# Patient Record
Sex: FEMALE | Race: WHITE | NOT HISPANIC OR LATINO | URBAN - METROPOLITAN AREA
[De-identification: names, ages, dates, MRNs, and addresses within clinical notes are randomized per-mention and may not be internally consistent; named-entity substitution may affect disease eponyms.]

---

## 2020-10-28 ENCOUNTER — OFFICE VISIT (OUTPATIENT)
Dept: ENDOCRINOLOGY | Facility: CLINIC | Age: 25
End: 2020-10-28

## 2020-10-28 ENCOUNTER — LAB (OUTPATIENT)
Dept: LAB | Facility: HOSPITAL | Age: 25
End: 2020-10-28

## 2020-10-28 VITALS
BODY MASS INDEX: 23.28 KG/M2 | HEIGHT: 69 IN | DIASTOLIC BLOOD PRESSURE: 74 MMHG | TEMPERATURE: 97.8 F | SYSTOLIC BLOOD PRESSURE: 112 MMHG | HEART RATE: 88 BPM | WEIGHT: 157.2 LBS | OXYGEN SATURATION: 99 %

## 2020-10-28 DIAGNOSIS — E03.9 ACQUIRED HYPOTHYROIDISM: Primary | ICD-10-CM

## 2020-10-28 DIAGNOSIS — E03.9 ACQUIRED HYPOTHYROIDISM: ICD-10-CM

## 2020-10-28 PROCEDURE — 99213 OFFICE O/P EST LOW 20 MIN: CPT | Performed by: INTERNAL MEDICINE

## 2020-10-28 PROCEDURE — 84443 ASSAY THYROID STIM HORMONE: CPT | Performed by: INTERNAL MEDICINE

## 2020-10-28 RX ORDER — LEVOTHYROXINE SODIUM 0.1 MG/1
100 TABLET ORAL DAILY
Qty: 90 TABLET | Refills: 3 | Status: SHIPPED | OUTPATIENT
Start: 2020-10-28 | End: 2021-10-04

## 2020-10-28 RX ORDER — LEVOTHYROXINE SODIUM 0.1 MG/1
100 TABLET ORAL DAILY
COMMUNITY
End: 2020-10-28 | Stop reason: SDUPTHER

## 2020-10-29 LAB — TSH SERPL DL<=0.05 MIU/L-ACNC: 6.71 UIU/ML (ref 0.27–4.2)

## 2020-11-04 ENCOUNTER — TELEPHONE (OUTPATIENT)
Dept: ENDOCRINOLOGY | Facility: CLINIC | Age: 25
End: 2020-11-04

## 2020-11-04 NOTE — TELEPHONE ENCOUNTER
PATIENT WOULD LIKE A CALL BACK ABOUT HER RECENT LAB RESULTS AND IF MEDICATIONS WILL BE CHANGING. PHONE NUMBER [phone]

## 2020-11-04 NOTE — TELEPHONE ENCOUNTER
I sent her a letter on Monday.. she is to take 1 and 1/2 of her tablets on the weekends. This is why I ask them to sign up for [employer] so that they can get the results quicker

## 2021-10-04 DIAGNOSIS — E03.9 ACQUIRED HYPOTHYROIDISM: ICD-10-CM

## 2021-10-04 RX ORDER — LEVOTHYROXINE SODIUM 0.1 MG/1
TABLET ORAL
Qty: 90 TABLET | Refills: 0 | Status: SHIPPED | OUTPATIENT
Start: 2021-10-04 | End: 2021-10-29 | Stop reason: DRUGHIGH

## 2021-10-28 ENCOUNTER — LAB (OUTPATIENT)
Dept: LAB | Facility: HOSPITAL | Age: 26
End: 2021-10-28

## 2021-10-28 ENCOUNTER — OFFICE VISIT (OUTPATIENT)
Dept: ENDOCRINOLOGY | Facility: CLINIC | Age: 26
End: 2021-10-28

## 2021-10-28 VITALS
DIASTOLIC BLOOD PRESSURE: 60 MMHG | WEIGHT: 144.4 LBS | HEIGHT: 70 IN | HEART RATE: 88 BPM | OXYGEN SATURATION: 100 % | SYSTOLIC BLOOD PRESSURE: 118 MMHG | BODY MASS INDEX: 20.67 KG/M2

## 2021-10-28 DIAGNOSIS — E03.9 ACQUIRED HYPOTHYROIDISM: ICD-10-CM

## 2021-10-28 DIAGNOSIS — E03.9 ACQUIRED HYPOTHYROIDISM: Primary | ICD-10-CM

## 2021-10-28 PROCEDURE — 84443 ASSAY THYROID STIM HORMONE: CPT

## 2021-10-28 PROCEDURE — 99213 OFFICE O/P EST LOW 20 MIN: CPT | Performed by: INTERNAL MEDICINE

## 2021-10-28 NOTE — PROGRESS NOTES
"     Office Note      Date: 10/28/2021  Patient Name: Cassandra rByan  MRN: [id number]  : 1995    Chief Complaint   Patient presents with   • Hypothyroidism     12 mo f/u        History of Present Illness:   Cassandra Bryan is a 26 y.o. female who presents for Hypothyroidism (12 mo f/u )   she takes 800 mcg per week   No changes to medical history.  She notes that if she goes without eating for 3 hours she gets shakey and has to eat- it does not happen at night.       Subjective          Review of Systems:   Review of Systems   Constitutional: Negative for fatigue and unexpected weight change.   HENT: Negative for trouble swallowing and voice change.    Eyes: Negative for visual disturbance.   Cardiovascular: Negative for palpitations.   Endocrine: Negative for cold intolerance and heat intolerance.   Skin: Negative.    Psychiatric/Behavioral: Negative for dysphoric mood and sleep disturbance. The patient is not nervous/anxious.        The following portions of the patient's history were reviewed and updated as appropriate: allergies, current medications, past family history, past medical history, past social history, past surgical history and problem list.    Objective     Visit Vitals  /60   Pulse 88   Ht 177.8 cm (70\")   Wt 65.5 kg (144 lb 6.4 oz)   SpO2 100%   BMI 20.72 kg/m²       Labs:    CBC w/DIFF  No results found for: WBC, RBC, HGB, HCT, MCV, MCH, MCHC, RDW, RDWSD, MPV, PLT, NEUTRORELPCT, LYMPHORELPCT, MONORELPCT, EOSRELPCT, BASORELPCT, AUTOIGPER, NEUTROABS, LYMPHSABS, MONOSABS, EOSABS, BASOSABS, AUTOIGNUM, NRBC    T4  No results found for: FREET4    TSH  No results found for: TSHBASE     Physical Exam:  Physical Exam  Vitals reviewed.   Constitutional:       Appearance: Normal appearance.   Eyes:      Extraocular Movements: Extraocular movements intact.   Neck:      Comments: No palpable thyroid tissue   Lymphadenopathy:      Cervical: No cervical adenopathy.   Neurological:      Mental Status: She " is alert.   Psychiatric:         Mood and Affect: Mood normal.         Thought Content: Thought content normal.         Judgment: Judgment normal.         Assessment / Plan      Assessment & Plan:  Problem List Items Addressed This Visit        Other    Acquired hypothyroidism - Primary    Current Assessment & Plan     Clinically euthyroid. Thyroid levels ordered. Medication to be adjusted accordingly.         Relevant Medications    levothyroxine (SYNTHROID, LEVOTHROID) 100 MCG tablet    Other Relevant Orders    TSH           Brody Do MD   10/28/2021

## 2021-10-29 LAB — TSH SERPL DL<=0.05 MIU/L-ACNC: 0.28 UIU/ML (ref 0.27–4.2)

## 2021-10-29 RX ORDER — LEVOTHYROXINE SODIUM 112 UG/1
112 TABLET ORAL DAILY
Qty: 90 TABLET | Refills: 3 | Status: SHIPPED | OUTPATIENT
Start: 2021-10-29 | End: 2022-10-28 | Stop reason: SDUPTHER

## 2021-12-29 DIAGNOSIS — E03.9 ACQUIRED HYPOTHYROIDISM: ICD-10-CM

## 2021-12-29 RX ORDER — LEVOTHYROXINE SODIUM 0.1 MG/1
TABLET ORAL
Qty: 90 TABLET | Refills: 0 | OUTPATIENT
Start: 2021-12-29

## 2022-10-28 ENCOUNTER — OFFICE VISIT (OUTPATIENT)
Dept: ENDOCRINOLOGY | Facility: CLINIC | Age: 27
End: 2022-10-28

## 2022-10-28 VITALS
SYSTOLIC BLOOD PRESSURE: 112 MMHG | HEART RATE: 94 BPM | BODY MASS INDEX: 22.62 KG/M2 | DIASTOLIC BLOOD PRESSURE: 64 MMHG | HEIGHT: 70 IN | OXYGEN SATURATION: 98 % | WEIGHT: 158 LBS

## 2022-10-28 DIAGNOSIS — E03.9 ACQUIRED HYPOTHYROIDISM: Primary | ICD-10-CM

## 2022-10-28 PROCEDURE — 99212 OFFICE O/P EST SF 10 MIN: CPT | Performed by: INTERNAL MEDICINE

## 2022-10-28 RX ORDER — LEVOTHYROXINE SODIUM 112 UG/1
112 TABLET ORAL DAILY
Qty: 90 TABLET | Refills: 3 | Status: SHIPPED | OUTPATIENT
Start: 2022-10-28 | End: 2023-10-28

## 2022-10-28 NOTE — PROGRESS NOTES
"     Office Note      Date: 10/28/2022  Patient Name: Cassandra Bryan  MRN: [id number]  : 1995    Chief Complaint   Patient presents with   • Hypothyroidism       History of Present Illness:   Cassandra Bryan is a 27 y.o. female who presents for Hypothyroidism  rx: T4 112 mcg per day  Changes- had a baby 6 weeks ago  Labs in second trimester were good  ---------------------  No other changes to her medical history  She has no questions for me     Subjective        Review of Systems:   Review of Systems   Constitutional: Negative for unexpected weight change.   HENT: Negative for trouble swallowing and voice change.    Eyes: Negative for visual disturbance.   Cardiovascular: Negative for palpitations.   Endocrine: Negative for cold intolerance and heat intolerance.   Genitourinary: Positive for menstrual problem.   Neurological: Negative for tremors.       The following portions of the patient's history were reviewed and updated as appropriate: allergies, current medications, past family history, past medical history, past social history, past surgical history and problem list.    Objective     Visit Vitals  /64   Pulse 94   Ht 177.8 cm (70\")   Wt 71.7 kg (158 lb)   SpO2 98%   BMI 22.67 kg/m²       Labs:    CBC w/DIFF  No results found for: WBC, RBC, HGB, HCT, MCV, MCH, MCHC, RDW, RDWSD, MPV, PLT, NEUTRORELPCT, LYMPHORELPCT, MONORELPCT, EOSRELPCT, BASORELPCT, AUTOIGPER, NEUTROABS, LYMPHSABS, MONOSABS, EOSABS, BASOSABS, AUTOIGNUM, NRBC    T4  No results found for: FREET4    TSH  No results found for: TSHBASE     Physical Exam:  Physical Exam  Vitals reviewed.   Constitutional:       General: She is not in acute distress.     Appearance: Normal appearance. She is not ill-appearing, toxic-appearing or diaphoretic.   HENT:      Head: Normocephalic and atraumatic.   Eyes:      Extraocular Movements: Extraocular movements intact.   Neck:      Comments: Hard irregular small gland  Lymphadenopathy:      Cervical: No " cervical adenopathy.   Neurological:      Mental Status: She is alert.   Psychiatric:         Mood and Affect: Mood normal.         Thought Content: Thought content normal.         Judgment: Judgment normal.         Assessment / Plan      Assessment & Plan:  Problem List Items Addressed This Visit        Other    Acquired hypothyroidism - Primary    Current Assessment & Plan     Stable. Labs were normal recently. No changes needed          Relevant Medications    levothyroxine (Synthroid) 112 MCG tablet        Brody Do MD   10/28/2022

## 2022-11-14 NOTE — PROGRESS NOTES
"     Office Note      Date: 10/28/2020  Patient Name: Cassandra Bryan  MRN: [id number]  : 1995    Chief Complaint   Patient presents with   • Follow-up   • Thyroid Problem       History of Present Illness:   Cassandra Bryan is a 25 y.o. female who presents for Follow-up and Thyroid Problem  . She has hypothyroidism  Duration- 8 years  Severity- moderate  Modifying factors- medication- levothyroxine 100 mcg per day  Symptoms: none at this point     She had a baby since last time and did not have to change her dose at all     Subjective        Family hx of thyroid disease: Yes, describe: mpm.    Review of Systems:   Review of Systems   Constitutional: Negative.    HENT: Negative.    Eyes: Negative.    Respiratory: Negative.    Cardiovascular: Negative.    Gastrointestinal: Negative.    Endocrine: Negative.    Genitourinary: Negative.    Musculoskeletal: Negative.    Skin: Negative.    Allergic/Immunologic: Negative.    Neurological: Negative.    Hematological: Negative.    Psychiatric/Behavioral: Negative.      The following portions of the patient's history were reviewed and updated as appropriate: allergies, current medications, past family history, past medical history, past social history, past surgical history and problem list.    Objective     Visit Vitals  /74   Pulse 88   Temp 97.8 °F (36.6 °C) (Infrared)   Ht 175.3 cm (69\")   Wt 71.3 kg (157 lb 3.2 oz)   SpO2 99%   BMI 23.21 kg/m²       Labs:    CBC w/DIFF  No results found for: WBC, RBC, HGB, HCT, MCV, MCH, MCHC, RDW, RDWSD, MPV, PLT, NEUTRORELPCT, LYMPHORELPCT, MONORELPCT, EOSRELPCT, BASORELPCT, AUTOIGPER, NEUTROABS, LYMPHSABS, MONOSABS, EOSABS, BASOSABS, AUTOIGNUM, NRBC    T4  No results found for: FREET4    TSH  No results found for: TSHBASE     Physical Exam:  Physical Exam  Constitutional:       Appearance: Normal appearance. She is normal weight.   HENT:      Head: Normocephalic and atraumatic.   Neck:      Musculoskeletal: Normal range of " Appointment made with Dr. Schaefer on 12/29 at 3:40PM.    motion and neck supple.      Thyroid: Thyromegaly present. No thyroid mass.   Lymphadenopathy:      Cervical: No cervical adenopathy.   Skin:     General: Skin is warm and dry.   Neurological:      General: No focal deficit present.      Mental Status: She is alert. Mental status is at baseline.   Psychiatric:         Mood and Affect: Mood normal.         Thought Content: Thought content normal.         Judgment: Judgment normal.         Assessment / Plan      Assessment & Plan:  Problem List Items Addressed This Visit        Endocrine    Acquired hypothyroidism - Primary    Current Assessment & Plan     Clinically euthyroid but due for labs          Relevant Medications    levothyroxine (SYNTHROID, LEVOTHROID) 100 MCG tablet    Other Relevant Orders    TSH           Brody Do MD   10/28/2020